# Patient Record
Sex: FEMALE
[De-identification: names, ages, dates, MRNs, and addresses within clinical notes are randomized per-mention and may not be internally consistent; named-entity substitution may affect disease eponyms.]

---

## 2023-12-08 ENCOUNTER — P2P PATIENT RECORD (OUTPATIENT)
Age: 48
End: 2023-12-08

## 2023-12-20 ENCOUNTER — OFFICE VISIT (OUTPATIENT)
Dept: URBAN - METROPOLITAN AREA SURGERY CENTER 13 | Facility: SURGERY CENTER | Age: 48
End: 2023-12-20

## 2024-03-11 ENCOUNTER — DAC (OUTPATIENT)
Dept: URBAN - METROPOLITAN AREA SURGERY CENTER 18 | Facility: SURGERY CENTER | Age: 49
End: 2024-03-11
Payer: COMMERCIAL

## 2024-03-11 ENCOUNTER — LAB (OUTPATIENT)
Dept: URBAN - METROPOLITAN AREA CLINIC 4 | Facility: CLINIC | Age: 49
End: 2024-03-11
Payer: COMMERCIAL

## 2024-03-11 DIAGNOSIS — K63.89 OTHER SPECIFIED DISEASES OF INTESTINE: ICD-10-CM

## 2024-03-11 DIAGNOSIS — Z12.11 COLON CANCER SCREENING: ICD-10-CM

## 2024-03-11 DIAGNOSIS — K63.5 BENIGN COLON POLYP: ICD-10-CM

## 2024-03-11 DIAGNOSIS — D12.0 ADENOMA OF CECUM: ICD-10-CM

## 2024-03-11 PROCEDURE — 45380 COLONOSCOPY AND BIOPSY: CPT | Performed by: INTERNAL MEDICINE

## 2024-03-11 PROCEDURE — G8907 PT DOC NO EVENTS ON DISCHARG: HCPCS | Performed by: INTERNAL MEDICINE

## 2024-03-11 PROCEDURE — 88305 TISSUE EXAM BY PATHOLOGIST: CPT | Performed by: PATHOLOGY

## 2024-03-11 PROCEDURE — 45385 COLONOSCOPY W/LESION REMOVAL: CPT | Performed by: INTERNAL MEDICINE

## 2024-10-04 ENCOUNTER — P2P PATIENT RECORD (OUTPATIENT)
Age: 49
End: 2024-10-04

## 2024-10-22 ENCOUNTER — DASHBOARD ENCOUNTERS (OUTPATIENT)
Age: 49
End: 2024-10-22

## 2024-10-22 ENCOUNTER — OFFICE VISIT (OUTPATIENT)
Dept: URBAN - METROPOLITAN AREA CLINIC 96 | Facility: CLINIC | Age: 49
End: 2024-10-22
Payer: COMMERCIAL

## 2024-10-22 ENCOUNTER — LAB OUTSIDE AN ENCOUNTER (OUTPATIENT)
Dept: URBAN - METROPOLITAN AREA CLINIC 96 | Facility: CLINIC | Age: 49
End: 2024-10-22

## 2024-10-22 VITALS
DIASTOLIC BLOOD PRESSURE: 85 MMHG | HEIGHT: 62 IN | BODY MASS INDEX: 19.69 KG/M2 | SYSTOLIC BLOOD PRESSURE: 120 MMHG | TEMPERATURE: 97.9 F | WEIGHT: 107 LBS | RESPIRATION RATE: 18 BRPM | HEART RATE: 74 BPM

## 2024-10-22 DIAGNOSIS — K21.9 GASTRIC REFLUX: ICD-10-CM

## 2024-10-22 DIAGNOSIS — Z86.0101 HX OF ADENOMATOUS COLONIC POLYPS: ICD-10-CM

## 2024-10-22 PROBLEM — 225587003: Status: ACTIVE | Noted: 2024-10-22

## 2024-10-22 PROBLEM — 429047008: Status: ACTIVE | Noted: 2024-10-22

## 2024-10-22 PROBLEM — 16331000: Status: ACTIVE | Noted: 2024-10-22

## 2024-10-22 PROCEDURE — 99214 OFFICE O/P EST MOD 30 MIN: CPT

## 2024-10-22 NOTE — HPI-TODAY'S VISIT:
49 year old female presents for evaluation of reflux. . Referred by Dr. Meera Deal for evaluation of reflux. Reflux for a long time - intermittent - "a few years".  Only at night when she lies down. Improves if she elevates her head with two pillows. Denies dysphagia, odynophagia, change in appetite, weight loss, hoarse voice, cough. Tries to go to bed 2 hours after eataing - but will still have symptoms. Remote history of EGD - approximately 15 years ago - current symptoms were not present at that time. . Colonoscopy ([facility]), 3/2024, Dr. Lam: Skin tags on perianal exam. Normal terminal ileum. 7 mm sessile TA in ileocecal valve. 7 mm hyperplastic sessile polyp in sigmoid colon. Repeat 7 years

## 2024-11-18 ENCOUNTER — OFFICE VISIT (OUTPATIENT)
Dept: URBAN - METROPOLITAN AREA SURGERY CENTER 18 | Facility: SURGERY CENTER | Age: 49
End: 2024-11-18
Payer: COMMERCIAL

## 2024-11-18 ENCOUNTER — CLAIMS CREATED FROM THE CLAIM WINDOW (OUTPATIENT)
Dept: URBAN - METROPOLITAN AREA CLINIC 4 | Facility: CLINIC | Age: 49
End: 2024-11-18
Payer: COMMERCIAL

## 2024-11-18 DIAGNOSIS — K29.70 CHRONIC ACITVE GASTRITIS (H.PYLORI NEGATIVE): ICD-10-CM

## 2024-11-18 DIAGNOSIS — K31.7 POLYP OF STOMACH AND DUODENUM: ICD-10-CM

## 2024-11-18 DIAGNOSIS — K31.89 OTHER DISEASES OF STOMACH AND DUODENUM: ICD-10-CM

## 2024-11-18 DIAGNOSIS — K29.70 GASTRITIS: ICD-10-CM

## 2024-11-18 DIAGNOSIS — K29.70 GASTRITIS, UNSPECIFIED, WITHOUT BLEEDING: ICD-10-CM

## 2024-11-18 DIAGNOSIS — K31.7 GASTRIC POLYP: ICD-10-CM

## 2024-11-18 DIAGNOSIS — K31.89 ACHYLIA: ICD-10-CM

## 2024-11-18 DIAGNOSIS — K21.9 ACID REFLUX: ICD-10-CM

## 2024-11-18 DIAGNOSIS — K31.7 BENIGN GASTRIC POLYP: ICD-10-CM

## 2024-11-18 DIAGNOSIS — K21.9 GERD: ICD-10-CM

## 2024-11-18 DIAGNOSIS — K21.9 GASTRO-ESOPHAGEAL REFLUX DISEASE WITHOUT ESOPHAGITIS: ICD-10-CM

## 2024-11-18 PROCEDURE — 00731 ANES UPR GI NDSC PX NOS: CPT | Performed by: NURSE ANESTHETIST, CERTIFIED REGISTERED

## 2024-11-18 PROCEDURE — 88312 SPECIAL STAINS GROUP 1: CPT | Performed by: PATHOLOGY

## 2024-11-18 PROCEDURE — 88305 TISSUE EXAM BY PATHOLOGIST: CPT | Performed by: PATHOLOGY

## 2024-11-18 PROCEDURE — 43239 EGD BIOPSY SINGLE/MULTIPLE: CPT | Performed by: INTERNAL MEDICINE
